# Patient Record
Sex: FEMALE | URBAN - NONMETROPOLITAN AREA
[De-identification: names, ages, dates, MRNs, and addresses within clinical notes are randomized per-mention and may not be internally consistent; named-entity substitution may affect disease eponyms.]

---

## 2019-06-06 ENCOUNTER — APPOINTMENT (RX ONLY)
Dept: URBAN - NONMETROPOLITAN AREA CLINIC 22 | Facility: CLINIC | Age: 79
Setting detail: DERMATOLOGY
End: 2019-06-06

## 2019-06-06 DIAGNOSIS — L57.0 ACTINIC KERATOSIS: ICD-10-CM

## 2019-06-06 DIAGNOSIS — Z71.89 OTHER SPECIFIED COUNSELING: ICD-10-CM

## 2019-06-06 PROBLEM — E78.5 HYPERLIPIDEMIA, UNSPECIFIED: Status: ACTIVE | Noted: 2019-06-06

## 2019-06-06 PROBLEM — I10 ESSENTIAL (PRIMARY) HYPERTENSION: Status: ACTIVE | Noted: 2019-06-06

## 2019-06-06 PROCEDURE — ? COUNSELING

## 2019-06-06 PROCEDURE — 99201: CPT

## 2019-06-06 PROCEDURE — ? PRESCRIPTION

## 2019-06-06 PROCEDURE — ? INVENTORY

## 2019-06-06 PROCEDURE — ? TREATMENT REGIMEN

## 2019-06-06 RX ORDER — FLUOROURACIL 50 MG/G
CREAM TOPICAL
Qty: 1 | Refills: 0 | Status: ERX | COMMUNITY
Start: 2019-06-06

## 2019-06-06 RX ADMIN — FLUOROURACIL: 50 CREAM TOPICAL at 18:45

## 2019-06-06 ASSESSMENT — LOCATION DETAILED DESCRIPTION DERM: LOCATION DETAILED: LEFT INFERIOR CENTRAL MALAR CHEEK

## 2019-06-06 ASSESSMENT — LOCATION ZONE DERM: LOCATION ZONE: FACE

## 2019-06-06 ASSESSMENT — LOCATION SIMPLE DESCRIPTION DERM: LOCATION SIMPLE: LEFT CHEEK

## 2019-06-06 NOTE — PROCEDURE: TREATMENT REGIMEN
Plan: Discussed LN2 vs 5FU vs PDT. Due to amount of precancerous lesions, patient is not a candidate for cryotherapy and she lives [place]ographically [place] far away for PDT treatment. Patient opts to treat with 5FU.
Initiate Treatment: Fluorouracil cream: Apply BID x 2 weeks to precancerous lesions on entire face.
Detail Level: Zone